# Patient Record
Sex: FEMALE | Race: WHITE | NOT HISPANIC OR LATINO | URBAN - NONMETROPOLITAN AREA
[De-identification: names, ages, dates, MRNs, and addresses within clinical notes are randomized per-mention and may not be internally consistent; named-entity substitution may affect disease eponyms.]

---

## 2020-04-14 ENCOUNTER — IMPORTED ENCOUNTER (OUTPATIENT)
Dept: URBAN - NONMETROPOLITAN AREA CLINIC 1 | Facility: CLINIC | Age: 70
End: 2020-04-14

## 2020-04-14 PROBLEM — H10.45: Noted: 2020-04-14

## 2020-04-14 PROCEDURE — 92002 INTRM OPH EXAM NEW PATIENT: CPT

## 2020-04-14 NOTE — PATIENT DISCUSSION
*Allergic Conjunctivitis:.-Discussed findings of exam in detail with the patient. - Discussed the episodic nature of the condition and treatment options with the patient. - The use of artificial tears and cool compresses were discussed with patient. - The offending allergen should be avoided if possible. - Patient advised not to rub eyes. - Prescription drops recommended. start tobradex 1gtt qid ou x 1wk

## 2020-10-09 ENCOUNTER — OFFICE VISIT (OUTPATIENT)
Dept: ONCOLOGY | Age: 70
End: 2020-10-09

## 2020-10-09 ENCOUNTER — OFFICE VISIT (OUTPATIENT)
Dept: ONCOLOGY | Age: 70
End: 2020-10-09
Payer: MEDICARE

## 2020-10-09 VITALS
TEMPERATURE: 95.4 F | SYSTOLIC BLOOD PRESSURE: 115 MMHG | BODY MASS INDEX: 29.51 KG/M2 | HEIGHT: 67 IN | RESPIRATION RATE: 16 BRPM | DIASTOLIC BLOOD PRESSURE: 70 MMHG | HEART RATE: 85 BPM | OXYGEN SATURATION: 97 % | WEIGHT: 188 LBS

## 2020-10-09 DIAGNOSIS — R19.00 PELVIC MASS IN FEMALE: Primary | ICD-10-CM

## 2020-10-09 PROCEDURE — 99205 OFFICE O/P NEW HI 60 MIN: CPT | Performed by: OBSTETRICS & GYNECOLOGY

## 2020-10-09 RX ORDER — ASPIRIN 325 MG
325 TABLET ORAL DAILY
COMMUNITY

## 2020-10-09 RX ORDER — UREA 10 %
LOTION (ML) TOPICAL
COMMUNITY

## 2020-10-09 RX ORDER — ALPRAZOLAM 0.5 MG/1
TABLET ORAL
COMMUNITY
Start: 2020-10-06

## 2020-10-09 RX ORDER — ATENOLOL 25 MG/1
TABLET ORAL
COMMUNITY
Start: 2020-07-29

## 2020-10-09 NOTE — LETTER
10/9/20 Patient: Bay Castellano YOB: 1950 Date of Visit: 10/9/2020 Adriana Georges NP 
[address] [address] VIA Facsimile: [phone] Dear Adriana Georges NP, Thank you for referring Ms. Bay Castellano to [facility] for evaluation. My notes for this consultation are attached. If you have questions, please do not hesitate to call me. I look forward to following your patient along with you. Sincerely, Guerline Lee MD

## 2020-10-09 NOTE — PROGRESS NOTES
Katha Aase is a 78 y.o. female presents in office for new patient exam for pelvic mass. Chief Complaint   Patient presents with    New Patient      Pt states not current on mammogram or screening, testing and treatment causes anxiety and panic attacks. Ovarian cysts seen on CT. Do you have any unusual vaginal bleeding, discharge or irritation? No  Do you have any changes in your bowel movements? No  Have you been experiencing nausea or vomiting? No  Have you been experiencing any continuous or worsening abdominal pain? Pt denies pain and discomfort, states she is starting to notice abdominal twinges. Any urinary burning? No    Visit Vitals  /70 (BP 1 Location: Left arm, BP Patient Position: Sitting)   Pulse 85   Temp (!) 95.4 °F (35.2 °C) (Oral)   Resp 16   Ht 5' 7\" (1.702 m)   Wt 85.3 kg (188 lb)   SpO2 97%   BMI 29.44 kg/m²         1. Have you been to the ER, urgent care clinic since your last visit? Hospitalized since your last visit? Pt seen at ED     2. Have you seen or consulted any other health care providers outside of the [address] [place] [address] since your last visit? Include any pap smears or colon screening. 3 most recent PHQ Screens 10/9/2020   Little interest or pleasure in doing things Several days   Feeling down, depressed, irritable, or hopeless More than half the days   Total Score PHQ 2 3       Fall Risk Assessment, last 12 mths 10/9/2020   Able to walk? Yes   Fall in past 12 months?  No       Learning Assessment 10/9/2020   PRIMARY LEARNER Patient   BARRIERS PRIMARY LEARNER NONE   CO-LEARNER CAREGIVER No   PRIMARY LANGUAGE ENGLISH   LEARNER PREFERENCE PRIMARY DEMONSTRATION   ANSWERED BY Patient   RELATIONSHIP SELF

## 2020-10-09 NOTE — PROGRESS NOTES
[address], [address], [address] [facility]  [address] [facility], [address] [place] [address]  [place], [address]   [phone]  Yolande Moser DO      Patient ID:  Name:  Kadeem Restrepo  MRN:  [id number]  :  1950/70 y.o. Date:  10/9/2020      HISTORY OF PRESENT ILLNESS:  Kadeem Restrepo is a 78 y.o. [de-identified]  postmenopausal female referred by Dr. Carmella Ruiz for complex right adnexal mass. She was seen initially for LLQ abdominal pain, CT revealed complex 6.1cm right adnexal cystic lesion. Patient was most recently seen by Dr. Carmella Ruiz as a second opinion for right ovarian cyst, TVUS demonstrated complex right ovarian mass, report below. Ca-125 WNL. Patient has panic disorder/anxiety about further anesthesia after reaction to anesthesia during oral surgery 2020. She presents today for further management. Denies Vaginal bleeding, change in vaginal discharge, new abdominopelvic pain, change in bladder/bowel habits        Pathology:  none    Labs:  9/15/20  Ca-125: 5.8      Imagin20  US  Retroverted uterus measures 5.1 x 3.5 x 2.2 cm.  Small fundal posterior   fibroid measures 1.4 x 1.2 x 1.0 cm.  Endometrial stripe measures 3.2 mm.    No free fluid.  Grossly normal left adnexa.  Right ovary with right   complex ovarian mass measuring 7.4 x 5.9 x 5.4 cm with thickened   septations with small blood flow through septated tissue with excrescence   noted. This imaging study was taken and reviewed with patient during their office   visit. Please see office visit note for image impression and details. 20  CT abd/pelvis  IMPRESSION  1. A complex 6.1 cm right adnexal cystic lesion with septation and possibly enhancing component. Recommend pelvic ultrasound for further evaluation and possible hypervascular component. 2. Bandlike hypoattenuation in the splenic dome. This could represent splenic infarct.  Laceration may have similar appearance although felt less likely. Correlate with history of recent trauma. 3. Possible gallbladder sludge/stone without evidence of acute cholecystitis. Subtle oblong hyperdensity in the gallbladder fundus is nonspecific but could be related to focal adenomyomatosis. 4. Colonic diverticulosis without evidence of diverticulitis. 5. Several small left subpleural nodules measuring up to 4 mm, nonspecific. ROS:   As above      There are no active problems to display for this patient. Past Medical History:   Diagnosis Date    Panic attacks       Past Surgical History:   Procedure Laterality Date    HX CATARACT REMOVAL      HX TONSILLECTOMY      US GUIDE BX PLEURA PERC NDL        OB History        1    Para        Term                AB   1    Living           SAB        TAB        Ectopic        Molar        Multiple        Live Births                  Social History     Tobacco Use    Smoking status: Former Smoker    Smokeless tobacco: Never Used   Substance Use Topics    Alcohol use: Not Currently      History reviewed. No pertinent family history. Current Outpatient Medications   Medication Sig    atenoloL (TENORMIN) 25 mg tablet     ALPRAZolam (Xanax) 0.5 mg tablet Xanax 0.5 mg tablet   Take 1 tablet 3 times a day by oral route.  melatonin 1 mg tablet Take  by mouth.  aspirin (aspirin) 325 mg tablet Take 325 mg by mouth daily. No current facility-administered medications for this visit.       No Known Allergies       OBJECTIVE:    Physical Exam  VITAL SIGNS: Visit Vitals  /70 (BP 1 Location: Left arm, BP Patient Position: Sitting)   Pulse 85   Temp (!) 95.4 °F (35.2 °C) (Oral)   Resp 16   Ht 5' 7\" (1.702 m)   Wt 85.3 kg (188 lb)   SpO2 97%   BMI 29.44 kg/m²      GENERAL JAMIN: in no apparent distress and well developed and well nourished   MUSCULOSKEL: no joint tenderness, deformity or swelling   INTEGUMENT:  warm and dry, no rashes or lesions ABDOMEN . soft, NT, ND, No masses appreciated   EXTREMITIES: extremities normal, atraumatic, no cyanosis or edema   PELVIC: External genitalia: normal general appearance  Vaginal: normal mucosa without prolapse or lesions  Cervix: normal appearance  Adnexa: enlarged adnexa, right  Uterus: normal single, nontender   RECTAL: deferred   LORENA SURVEY: Cervical, supraclavicular, axillary and inguinal nodes normal.   NEURO: Grossly normal         IMPRESSION/PLAN:  1. Pelvic mass   -reviewed her imaging and bloodwork and explained likely benign but would recommend surgical evaluation   -discussed plan for laparoscopic resection of mass, BSO with intraop path and hysterectomy, staging if malignant   -explained would need general anesthesia.  Will get cardiac clearance and scheduled once cleared   -pt understands and in agreement with plan      The total time spent was 60 minutes regarding this patients diagnosis of pelvic mass  and >50% of this time was spent counseling and coordinating care    [address] [facility]  10/9/68575:03 PM

## 2020-10-12 ENCOUNTER — TELEPHONE (OUTPATIENT)
Dept: ONCOLOGY | Age: 70
End: 2020-10-12

## 2020-10-12 NOTE — TELEPHONE ENCOUNTER
Spoke with patient and relayed that records were received by Cardiologist and [facility]. Appointment is now scheduled at 8:15 am with an 8 am arrival on Monday 10/19/2020.
Spoke with patient to relay that appointment is scheduled with Dr. Melissa Ulrich tomorrow at 3:15 pm with an arrival time of 3 pm. Patient states that notes from her Cardiologist in [place] and the ED would be needed for her visit. Patient provided number to cardiologist, will attempt to get records for appointment.
No change

## 2020-11-19 ENCOUNTER — TELEPHONE (OUTPATIENT)
Dept: ONCOLOGY | Age: 70
End: 2020-11-19

## 2020-11-19 NOTE — TELEPHONE ENCOUNTER
Spoke with patient after speaking to the PAT department, Ms. Manuel Mcdonaldner stating patient did not complete her screening and patient was extremely rude. I myself contacted the patient to see if patient actually got cleared by  for cardiac clearance and patient stated she did not want to continue care with our office- nor . Patient requested her records to be released to her PCP. Patient provided me with Keena Alexander's office fax number and I reassured patient I would fax a release of records form to her PCP office so our office could release her records. Patient was ok with plan. I relied information to Manuelo Brunner in the PAT department.     Keena Alexander fax provided by patient:[phone] MARIE/Sudhakar Yap [facility] Fax: [phone]

## 2022-04-09 ASSESSMENT — VISUAL ACUITY
OD_SC: 20/30
OS_SC: 20/25-2

## 2022-04-09 ASSESSMENT — TONOMETRY
OS_IOP_MMHG: 14
OD_IOP_MMHG: 14
